# Patient Record
(demographics unavailable — no encounter records)

---

## 2024-10-10 NOTE — ASSESSMENT
[FreeTextEntry1] : HISTORY:  76 y/o female presents as follow up for osteoporosis.  Pt has osteoporosis based on DXA on 1/2024. Pt has not had DXA for >20 years prior to this DXA.  Pt had R arm fracture in setting of fall into a table in the past. Denies spontaneous or pathologic fractures.  Denies cancers, radiation therapy, long term steroids, GERD, upcoming dental procedures.   Pt has severe osteoporosis without pathologic fractures.  I discussed option of aggressive therapy with anabolic agents such as Forteo and Tymlos. However, pt deferred due to daily injections. Discussed other options of PO/IV bisphosphonates, Prolia with the patient. Patient was started on Prolia by me in 3/2024.   INTERVAL HISTORY:  Pt here for Prolia injection visit.  - Prolia injection today. See procedure note.  - c/w Prolia 60mg SQ Q6Months.  Side effects of denosumab were discussed with the pt in detail including ONJ, risk of rapid bone loss, vertebral fractures, atypical femoral fractures after discontinuation.  Avoid if CrCl < 15, plan for invasive oral procedure.  - Continue Calcium and Vit D supplementation.  - Advised to reduce/eliminate/avoid tobacco, alcohol, caffeine intake, increase weight bearing exercise, and minimize fall risk.  - Last DXA 1/2024. Consider 1-2 year follow up given pt's severe osteoporosis on medication treatment.  - RTC 6 months for Prolia injection visit and follow up  WORKUP:  XR C-spine (10/2018): Diffuse DDD  DXA (1/2024): Osteoporosis. Lowest T-score -3.4 L femoral neck.

## 2024-10-10 NOTE — PROCEDURE
[Today's Date:] : Date: [unfilled] [Risks] : risks [Benefits] : benefits [Alternatives] : alternatives [Consent Obtained] : written consent was obtained prior to the procedure and is detailed in the patient's record [Patient] : Prior to the start of the procedure a time out was taken and the identity of the patient was confirmed via name and date of birth with the patient. The correct site and the procedure to be performed were confirmed. The correct side was confirmed if applicable. The availability of the correct equipment was verified [Therapeutic] : therapeutic [#1 Site: ______] : #1 site identified in the [unfilled] [Alcohol] : alcohol [Tolerated Well] : the patient tolerated the procedure well [No Complications] : there were no complications [Instructions Given] : handouts/patient instructions were given to patient [Patient Instructed to Call] : patient was instructed to call if redness at site, a decrease in range of motion or an increase in pain is noted after procedure. [de-identified] : Prolia injection [de-identified] : Prolia 60mg SQ injection